# Patient Record
(demographics unavailable — no encounter records)

---

## 2025-02-07 NOTE — REASON FOR VISIT
[Home] : at home, [unfilled] , at the time of the visit. [Medical Office: ([facility])___] : at the medical office located in  [Telehealth (audio & video)] : This visit was provided via telehealth using real-time 2-way audio visual technology. [Verbal consent obtained from patient] : the patient, [unfilled] [Follow-Up: _____] : a [unfilled] follow-up visit

## 2025-02-07 NOTE — HISTORY OF PRESENT ILLNESS
[FreeTextEntry1] : Ms. Jesus is a pleasant 45yo female who presents today for follow up.  She underwent successful venous sinus stenting on 1/20/17 for pulsatile tinnitus.  After the procedure, her pulsatile tinnitus completely resolved.  She states that since our last visit, she developed recurrent left pulsatile tinnitus that resolves with ipsilateral neck pressure.  It is not bothersome.   mrv 9/2024 reveals patent stent, no new steonsis; left jug bulb diverticulum  likely cause; not bothersome  follow up as needed for worsening pt

## 2025-02-07 NOTE — ASSESSMENT
[FreeTextEntry1] : Impression: s/p venous sinus stenting for left pulsatile tinnitus 2017 Recent Recurrent Left Pulsatile Tinnitus Resolves with Ipsilateral Neck Pressure  MRV 9/2024 reveals patent stent, no new stenosis; left jugular bulb diverticulum  I reviewed the MRV with Ms. Edin is detail.  The jugular bulb diverticulum is likely the cause of her pulsatile tinnitus.  At this time, the sound is not very bothersome.  I reassured her that this is not a dangerous problem and there is no need for further evaluation or consideration to treat unless the pulsatile tinnitus worsens.    Plan: Follow Up with Me if The Pulsatile Tinnitus Becomes More Bothersome

## 2025-02-07 NOTE — HISTORY OF PRESENT ILLNESS
[FreeTextEntry1] : Ms. Jesus is a pleasant 45yo female who presents today for follow up.  She underwent successful venous sinus stenting on 1/20/17 for pulsatile tinnitus.  After the procedure, her pulsatile tinnitus completely resolved.  She states that since our last visit, she developed recurrent left pulsatile tinnitus that resolves with ipsilateral neck pressure.  It is not bothersome.   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

## 2025-02-07 NOTE — REASON FOR VISIT
[Home] : at home, [unfilled] , at the time of the visit. [Medical Office: ([facility])___] : at the medical office located in  [Telehealth (audio & video)] : This visit was provided via telehealth using real-time 2-way audio visual technology. [Verbal consent obtained from patient] : the patient, [unfilled] [Follow-Up: _____] : a [unfilled] follow-up visit